# Patient Record
Sex: MALE | Race: OTHER | Employment: FULL TIME | URBAN - METROPOLITAN AREA
[De-identification: names, ages, dates, MRNs, and addresses within clinical notes are randomized per-mention and may not be internally consistent; named-entity substitution may affect disease eponyms.]

---

## 2019-01-05 NOTE — LETTER
Date & Time: 5/3/2019, 2:59 AM  Patient: Silvia Rich  Encounter Provider(s): Ludmila Hughes MD       To Whom It May Concern:    Tito Kang was seen and treated in our department on 5/3/2019.  He should not return to work [employer]
none

## 2019-05-03 ENCOUNTER — HOSPITAL ENCOUNTER (EMERGENCY)
Facility: HOSPITAL | Age: 34
Discharge: HOME | End: 2019-05-03
Attending: EMERGENCY MEDICINE
Payer: OTHER MISCELLANEOUS

## 2019-05-03 VITALS
HEART RATE: 84 BPM | WEIGHT: 298 LBS | TEMPERATURE: 98 F | OXYGEN SATURATION: 95 % | SYSTOLIC BLOOD PRESSURE: 148 MMHG | DIASTOLIC BLOOD PRESSURE: 80 MMHG | HEIGHT: 69 IN | BODY MASS INDEX: 44.14 KG/M2 | RESPIRATION RATE: 18 BRPM

## 2019-05-03 DIAGNOSIS — T14.8XXA BLEEDING FROM WOUND: Primary | ICD-10-CM

## 2019-05-03 PROCEDURE — 99282 EMERGENCY DEPT VISIT SF MDM: CPT

## 2019-05-03 NOTE — ED NOTES
Patient to [facility] at this time accompanied by family for c/o right lower leg laceration. Patient dropped 20lbs bag of hamburgers onto right shin at work today. Patient admits he was unable to stop bleeding while at home tonight.  Large abrasion to right sh

## 2019-05-03 NOTE — ED PROVIDER NOTES
Patient Seen in: [facility] AND [facility] Emergency Department    History   Patient presents with:  Varicose Veins (cardiovascular)    Stated Complaint: wound bleeding    HPI    57-year-old male with  history of varicose veins status post varicose vein surgery Eyes: Pupils are equal, round, and reactive to light. EOM are normal.   Neck: Normal range of motion. Cardiovascular: Normal rate and regular rhythm. No murmur heard.   2+ radial and DP pulses b/l, no leg swelling   Pulmonary/Chest: Effort normal and for appropriate follow-up with their PCP to have their blood pressure re-checked within 1 week was provided.      Medical Record Review: I personally reviewed available prior medical records for any recent pertinent discharge summaries, testing, and procedu

## 2019-05-03 NOTE — ED NOTES
Surgicel and ACE wrap applied to right lower leg. Patient tolerated procedure well. +CMS and +ROM noted.

## 2020-08-18 ENCOUNTER — HOSPITAL ENCOUNTER (EMERGENCY)
Facility: HOSPITAL | Age: 35
Discharge: HOME | End: 2020-08-18
Attending: EMERGENCY MEDICINE

## 2020-08-18 VITALS
RESPIRATION RATE: 18 BRPM | WEIGHT: 297.63 LBS | SYSTOLIC BLOOD PRESSURE: 140 MMHG | HEART RATE: 71 BPM | TEMPERATURE: 98 F | OXYGEN SATURATION: 97 % | BODY MASS INDEX: 40.31 KG/M2 | DIASTOLIC BLOOD PRESSURE: 78 MMHG | HEIGHT: 72 IN

## 2020-08-18 DIAGNOSIS — I83.892 BLEEDING FROM VARICOSE VEINS OF LEFT LOWER EXTREMITY: Primary | ICD-10-CM

## 2020-08-18 DIAGNOSIS — Z51.89 VISIT FOR WOUND CHECK: ICD-10-CM

## 2020-08-18 PROCEDURE — 99281 EMR DPT VST MAYX REQ PHY/QHP: CPT

## 2020-08-18 NOTE — ED INITIAL ASSESSMENT (HPI)
Diabetic left leg ulcer that has arterial bleeding as reported by md kendrick. Dressing in place to left lower leg with controlled bleeding at this time. No distress noted.

## 2020-08-18 NOTE — ED NOTES
Patient presents with:  Laceration Abrasion    BP (!) 176/88   Pulse 73   Temp 97.7 °F (36.5 °C) (Oral)   Resp 16   Ht 182.9 cm (6')   Wt 135 kg   SpO2 98%   BMI 40.36 kg/m²     PATIENT AOX3 TO ED VIA SELF PER PATIENT REPORTED BLEEDING TO LEFT LOWER LEG X

## 2020-08-19 NOTE — ED PROVIDER NOTES
Patient Seen in: [facility] AND [facility] Emergency Department      History   Patient presents with:  Laceration Abrasion    Stated Complaint: diabetic ulcer    HPI    78-year-old male presents for evaluation for a bleeding wound.   Patient states that his been Nose: No nasal deformity. Mouth/Throat:      Lips: Pink. Eyes:      General: Lids are normal.      Extraocular Movements: Extraocular movements intact.    Cardiovascular:      Pulses:           Dorsalis pedis pulses are 2+ on the right side and 2+ For follow up and re-evaluation          Medications Prescribed:  There are no discharge medications for this patient.

## 2021-03-06 ENCOUNTER — HOSPITAL ENCOUNTER (EMERGENCY)
Facility: HOSPITAL | Age: 36
Discharge: HOME | End: 2021-03-06
Attending: EMERGENCY MEDICINE

## 2021-03-06 DIAGNOSIS — S81.802A WOUND OF LEFT LOWER EXTREMITY, INITIAL ENCOUNTER: Primary | ICD-10-CM

## 2021-03-06 PROCEDURE — 99282 EMERGENCY DEPT VISIT SF MDM: CPT

## 2021-03-06 NOTE — ED PROVIDER NOTES
Patient Seen in: [facility] AND [facility] Emergency Department      History   No chief complaint on file.     Stated Complaint: Bleeding from Leg    HPI/Subjective:   HPI    Patient is a 47-year-old Mauritian-speaking male who arrives with his brother for left low am    Follow-up:  Inessa Chao MD  [address]  [phone]    In 1 week            Medications Prescribed:  There are no discharge medications for this patient.

## (undated) NOTE — ED AVS SNAPSHOT
Hilary Markell   MRN: [id number]    Department:  [facility] Emergency Department   Date of Visit:  5/3/2019           Disclosure     Insurance plans vary and the physician(s) referred by the ER may not be covered by your plan.  Please CARE PHYSICIAN AT ONCE OR RETURN IMMEDIATELY TO THE EMERGENCY DEPARTMENT. If you have been prescribed any medication(s), please fill your prescription right away and begin taking the medication(s) as directed.   If you believe that any of the medications